# Patient Record
Sex: FEMALE
[De-identification: names, ages, dates, MRNs, and addresses within clinical notes are randomized per-mention and may not be internally consistent; named-entity substitution may affect disease eponyms.]

---

## 2024-01-16 ENCOUNTER — DASHBOARD ENCOUNTERS (OUTPATIENT)
Age: 49
End: 2024-01-16

## 2024-01-16 ENCOUNTER — LAB OUTSIDE AN ENCOUNTER (OUTPATIENT)
Dept: URBAN - METROPOLITAN AREA CLINIC 35 | Facility: CLINIC | Age: 49
End: 2024-01-16

## 2024-01-16 ENCOUNTER — OFFICE VISIT (OUTPATIENT)
Dept: URBAN - METROPOLITAN AREA CLINIC 35 | Facility: CLINIC | Age: 49
End: 2024-01-16
Payer: COMMERCIAL

## 2024-01-16 VITALS
HEART RATE: 90 BPM | SYSTOLIC BLOOD PRESSURE: 105 MMHG | BODY MASS INDEX: 27.38 KG/M2 | WEIGHT: 145 LBS | DIASTOLIC BLOOD PRESSURE: 65 MMHG | OXYGEN SATURATION: 99 % | HEIGHT: 61 IN

## 2024-01-16 DIAGNOSIS — Z12.11 COLON CANCER SCREENING: ICD-10-CM

## 2024-01-16 PROCEDURE — 99242 OFF/OP CONSLTJ NEW/EST SF 20: CPT | Performed by: INTERNAL MEDICINE

## 2024-01-16 PROCEDURE — 99202 OFFICE O/P NEW SF 15 MIN: CPT | Performed by: INTERNAL MEDICINE

## 2024-01-16 RX ORDER — SOD SULF/POT CHLORIDE/MAG SULF 1.479 G
12 TABLETS THE FIRST DOSE THE EVENING BEFORE AND SECOND DOSE THE MORNING OF COLONOSCOPY TABLET ORAL TWICE A DAY
Qty: 24 | Refills: 0 | OUTPATIENT
Start: 2024-01-16 | End: 2024-01-17

## 2024-01-16 RX ORDER — FERROUS SULFATE 325(65) MG
1 TABLET TABLET ORAL
Status: ACTIVE | COMMUNITY

## 2024-01-16 NOTE — PHYSICAL EXAM CONSTITUTIONAL:
well developed, well nourished , in no acute distress , ambulating without difficulty , normal communication ability [Consult Letter:] : I had the pleasure of evaluating your patient, [unfilled]. [Please see my note below.] : Please see my note below. [Consult Closing:] : Thank you very much for allowing me to participate in the care of this patient.  If you have any questions, please do not hesitate to contact me. [Sincerely,] : Sincerely,

## 2024-01-16 NOTE — HPI-COLORECTAL CANCER SCREENING
48 year old female patient presents today for a consultation for colorectal cancer screening. Denies a family history of Colon/rectal cancer or polyps. Denies having a colonoscopy/sigmoidoscopy before. Patient currently admits 1-2 bowel movements per day with semisolid consistency without melena, blood, or mucus. Patient denies Bleeding disorders/Blood thinners  Respiratory diseases, Heart Diseases, Sleep apnea, Spinal cord injuries, or previous complications with anesthesia.

## 2024-02-26 ENCOUNTER — COLON (OUTPATIENT)
Dept: URBAN - METROPOLITAN AREA SURGERY CENTER 8 | Facility: SURGERY CENTER | Age: 49
End: 2024-02-26

## 2024-03-19 ENCOUNTER — OV EP (OUTPATIENT)
Dept: URBAN - METROPOLITAN AREA CLINIC 35 | Facility: CLINIC | Age: 49
End: 2024-03-19

## 2024-03-19 NOTE — HPI-COLONOSCOPY FOLLOWUP
50 year old female patient presents today for her follow up colonoscopy. Since the procedure patient __    Patient currently  admits __ bowel movements per day with __ consistency with/without pain, melena, blood, or mucus.    COL - 2/26/2024 - Impression  -  The quality of the bowel preparation was excellent.  - The examined portion of the ileum was normal.  - Three 2 to 3 mm polyps in the distal sigmoid colon, in the mid transverse colon and in the cecum, removed with a cold biopsy forceps. Resected and retrieved.  - Diverticulosis in the ascending colon.  - Diverticulosis in the sigmoid colon.  - Non-bleeding internal hemorrhoids    Pathology  (A) Cecum, Polypectomy:  PROMINENT MUCOSAL LYMPHOID AGGREGATES.  Negative for Dysplasia or Malignancy.  (B) Colon, Transverse, Middle, Polypectomy:  BENIGN MUCOSAL POLYP(S). See Comment.  Negative for Dysplasia or Malignancy.  COMMENT: We use "benign mucosal polyp" to refer to an endoscopically polypoid lesion that shows no  dysplastic or hyperplastic epithelium or classic features of hamartomatous polyp.  (C) Colon, Sigmoid, Distal, Polypectomy:  HYPERPLASTIC POLYP(S).